# Patient Record
Sex: FEMALE | Race: BLACK OR AFRICAN AMERICAN | Employment: OTHER | URBAN - METROPOLITAN AREA
[De-identification: names, ages, dates, MRNs, and addresses within clinical notes are randomized per-mention and may not be internally consistent; named-entity substitution may affect disease eponyms.]

---

## 2019-12-07 ENCOUNTER — HOSPITAL ENCOUNTER (EMERGENCY)
Facility: CLINIC | Age: 54
Discharge: HOME | End: 2019-12-08
Attending: EMERGENCY MEDICINE | Admitting: EMERGENCY MEDICINE
Payer: MEDICARE

## 2019-12-07 DIAGNOSIS — M54.16 LUMBAR RADICULOPATHY: ICD-10-CM

## 2019-12-07 DIAGNOSIS — R23.8 CHANGE OF SKIN COLOR: ICD-10-CM

## 2019-12-07 PROCEDURE — 99283 EMERGENCY DEPT VISIT LOW MDM: CPT

## 2019-12-07 NOTE — ED AVS SNAPSHOT
[facility] Emergency Department  [address]  [facility] [address]  Phone:  [phone]  Fax:  [phone]                                    Joseline Becerra   MRN: [id number]    Department:  [facility] Emergency Department   Date of Visit:  12/7/2019           After Visit Summary Signature Page    I have received my discharge instructions, and my questions have been answered. I have discussed any challenges I see with this plan with the nurse or doctor.    ..........................................................................................................................................  Patient/Patient Representative Signature      ..........................................................................................................................................  Patient Representative Print Name and Relationship to Patient    ..................................................               ................................................  Date                                   Time    ..........................................................................................................................................  Reviewed by Signature/Title    ...................................................              ..............................................  Date                                               Time          22EPIC Rev 08/18

## 2019-12-08 VITALS
HEART RATE: 97 BPM | SYSTOLIC BLOOD PRESSURE: 152 MMHG | TEMPERATURE: 97.5 F | DIASTOLIC BLOOD PRESSURE: 79 MMHG | OXYGEN SATURATION: 98 % | WEIGHT: 205.69 LBS | RESPIRATION RATE: 15 BRPM

## 2019-12-08 LAB
ALBUMIN SERPL-MCNC: 3.2 G/DL (ref 3.4–5)
ALP SERPL-CCNC: 99 U/L (ref 40–150)
ALT SERPL W P-5'-P-CCNC: 22 U/L (ref 0–50)
ANION GAP SERPL CALCULATED.3IONS-SCNC: 8 MMOL/L (ref 3–14)
AST SERPL W P-5'-P-CCNC: 19 U/L (ref 0–45)
BASOPHILS # BLD AUTO: 0 10E9/L (ref 0–0.2)
BASOPHILS NFR BLD AUTO: 0.7 %
BILIRUB SERPL-MCNC: 0.5 MG/DL (ref 0.2–1.3)
BUN SERPL-MCNC: 22 MG/DL (ref 7–30)
CALCIUM SERPL-MCNC: 9.3 MG/DL (ref 8.5–10.1)
CHLORIDE SERPL-SCNC: 108 MMOL/L (ref 94–109)
CO2 SERPL-SCNC: 27 MMOL/L (ref 20–32)
CREAT SERPL-MCNC: 0.88 MG/DL (ref 0.52–1.04)
DIFFERENTIAL METHOD BLD: ABNORMAL
EOSINOPHIL # BLD AUTO: 0.1 10E9/L (ref 0–0.7)
EOSINOPHIL NFR BLD AUTO: 2.2 %
ERYTHROCYTE [DISTWIDTH] IN BLOOD BY AUTOMATED COUNT: 13 % (ref 10–15)
GFR SERPL CREATININE-BSD FRML MDRD: 74 ML/MIN/{1.73_M2}
GLUCOSE SERPL-MCNC: 240 MG/DL (ref 70–99)
HCT VFR BLD AUTO: 37.7 % (ref 35–47)
HGB BLD-MCNC: 11.6 G/DL (ref 11.7–15.7)
IMM GRANULOCYTES # BLD: 0 10E9/L (ref 0–0.4)
IMM GRANULOCYTES NFR BLD: 0.2 %
LYMPHOCYTES # BLD AUTO: 2.9 10E9/L (ref 0.8–5.3)
LYMPHOCYTES NFR BLD AUTO: 48.1 %
MCH RBC QN AUTO: 29.9 PG (ref 26.5–33)
MCHC RBC AUTO-ENTMCNC: 30.8 G/DL (ref 31.5–36.5)
MCV RBC AUTO: 97 FL (ref 78–100)
MONOCYTES # BLD AUTO: 0.4 10E9/L (ref 0–1.3)
MONOCYTES NFR BLD AUTO: 7.3 %
NEUTROPHILS # BLD AUTO: 2.5 10E9/L (ref 1.6–8.3)
NEUTROPHILS NFR BLD AUTO: 41.5 %
NRBC # BLD AUTO: 0 10*3/UL
NRBC BLD AUTO-RTO: 0 /100
PLATELET # BLD AUTO: 275 10E9/L (ref 150–450)
POTASSIUM SERPL-SCNC: 3.8 MMOL/L (ref 3.4–5.3)
PROT SERPL-MCNC: 7.4 G/DL (ref 6.8–8.8)
RBC # BLD AUTO: 3.88 10E12/L (ref 3.8–5.2)
SODIUM SERPL-SCNC: 143 MMOL/L (ref 133–144)
WBC # BLD AUTO: 5.9 10E9/L (ref 4–11)

## 2019-12-08 PROCEDURE — 80053 COMPREHEN METABOLIC PANEL: CPT | Performed by: EMERGENCY MEDICINE

## 2019-12-08 PROCEDURE — 85025 COMPLETE CBC W/AUTO DIFF WBC: CPT | Performed by: EMERGENCY MEDICINE

## 2019-12-08 NOTE — ED PROVIDER NOTES
History     Chief Complaint:    Jaundice      HPI   Joseline Becerra is a 54 year old female who presents with concerns for skin discoloration on her hands.  Patient has had digestive problems.  She is noticed that her skin changed color.  She denies dark urine but feels like she could have been jaundiced.  Patient is referred to the emergency room by her daughter for evaluation.  The patient had no vomiting.  She denies alcohol use.  She is had some back pain that radiates down her right leg for the last few months and wonders about that as well.  No history of fall or trauma to the back.  No bowel or bladder incontinence.  No numbness in the saddle distribution.  No fever.  No sick contacts.    Allergies:    Allergies   Allergen Reactions     Motrin [Ibuprofen]      Oxycodone         Medications:      No current outpatient medications on file.      Problem List:      There are no active problems to display for this patient.       Past Medical History:      No past medical history on file.    Past Surgical History:      No past surgical history on file.    Family History:      No family history on file.    Social History:    Marital Status:    Social History     Tobacco Use     Smoking status: Not on file   Substance Use Topics     Alcohol use: Not on file     Drug use: Not on file        Review of Systems  No fever no constant abdominal pain no vomiting no diarrhea all the systems negative except as above    Physical Exam   First Vitals:  BP: (!) 202/101  Heart Rate: 96  Temp: 97.5  F (36.4  C)  Resp: 15  Weight: 93.3 kg (205 lb 11 oz)  SpO2: 98 %      Physical Exam  Vitals signs reviewed.   HENT:      Head: Normocephalic.      Nose: No congestion.   Eyes:      General: No scleral icterus.  Neck:      Musculoskeletal: Normal range of motion.   Cardiovascular:      Rate and Rhythm: Normal rate.   Pulmonary:      Effort: Pulmonary effort is normal.   Abdominal:      General: Abdomen is flat.      Palpations: Abdomen  is soft.   Musculoskeletal:      Comments: Patient describes low back pain that radiates down the right leg.  There is normal strength in both leg patient is ambulatory with minimally and antalgic gait.  No weakness of dorsiflexion or plantarflexion of the foot.   Neurological:      General: No focal deficit present.      Mental Status: She is alert.   Psychiatric:         Mood and Affect: Mood normal.           Emergency Department Course           Laboratory:  Labs Ordered and Resulted from Time of ED Arrival Up to the Time of Departure from the ED   CBC WITH PLATELETS DIFFERENTIAL - Abnormal; Notable for the following components:       Result Value    Hemoglobin 11.6 (*)     MCHC 30.8 (*)     All other components within normal limits   COMPREHENSIVE METABOLIC PANEL - Abnormal; Notable for the following components:    Glucose 240 (*)     Albumin 3.2 (*)     All other components within normal limits         Emergency Department Course:      Impression & Plan        Medical Decision Making:  Patient presents with concerns of yellow discoloration of the hands.  She is concerned that she is jaundice.  On examination she has no scleral icterus but has some yellowing discoloration and was iodine look to her hands.  Patient denies contact iodine or to her skin based on his history lab tests were sent and show a normal total bilirubin.  Patient also complains of back pain that is chronic this seems like a lumbar radiculopathy.  There is no red flags for concerns for discitis or cauda equina.  Patient suffered reassurance and follow-up with primary care patient questions were answered and was discharged with her daughter.  Diagnosis:    ICD-10-CM    1. Lumbar radiculopathy M54.16    2. Change of skin color R23.8        Disposition:  discharged to home    Discharge Medications:  There are no discharge medications for this patient.      Eren Castro MD  12/7/2019   [facility] EMERGENCY DEPARTMENT        Eren Castro MD  12/08/19 [id number]

## 2019-12-08 NOTE — ED TRIAGE NOTES
Pt states she is concerned that she may be jaundiced. Denies hx of liver disease. Also c/o increased flatulence for 2 weeks. ABCs intact GCS 15

## 2019-12-08 NOTE — DISCHARGE INSTRUCTIONS
Your hand skin color change with cause of which is unclear consider something that got in contact with the hands of the skin.  With pain that radiates from your back down your right leg please discuss further evaluation with your primary care provider.